# Patient Record
Sex: FEMALE | Race: WHITE
[De-identification: names, ages, dates, MRNs, and addresses within clinical notes are randomized per-mention and may not be internally consistent; named-entity substitution may affect disease eponyms.]

---

## 2023-01-11 ENCOUNTER — HOSPITAL ENCOUNTER (OUTPATIENT)
Dept: HOSPITAL 47 - RADMAMWWP | Age: 58
Discharge: HOME | End: 2023-01-11
Attending: OBSTETRICS & GYNECOLOGY
Payer: COMMERCIAL

## 2023-01-11 DIAGNOSIS — Z12.31: Primary | ICD-10-CM

## 2023-01-11 DIAGNOSIS — Z78.0: ICD-10-CM

## 2023-01-11 PROCEDURE — 77067 SCR MAMMO BI INCL CAD: CPT

## 2023-01-12 NOTE — MM
Reason for Exam: Screening  (asymptomatic). 

Last mammogram was performed 6 year(s) and 1 month(s) ago. 





Patient History: 

Menarche at age 11. First Full-Term Pregnancy at age 29. Late child-bearing (after 29).

Postmenopausal. Estrogen, ending at age 54. Progesterone, ending at age 54. 2008, Cyst Aspiration on

the Right side. 





Risk Values: 

Renate 5 year model risk: 1.8%.

NCI Lifetime model risk: 10.5%.





Prior Study Comparison: 

1/20/2012 Bilateral Diagnostic Mammogram, [facility]. 12/3/2016 Bilateral Screening Mammogram, [facility].

12/30/2016 Right Diagnostic Mammogram, [facility]. 





Tissue Density: 

The breast tissue is heterogeneously dense. This may lower the sensitivity of mammography.





Findings: 

Analyzed By CAD. 

There are regional central tiny round calcifications in the right breast redemonstrated. Stable 4 mm

round mass in the upper outer aspect right breast.



There is no suspicious new group of microcalcifications or new suspicious mass in either breast. 





Overall Assessment: Benign, BI-RAD 2





Management: 

Screening Mammogram of both breasts in 1 year.

Some advise bilateral breast ultrasound surveillance in patients with background dense tissue. A

clinical breast exam by your physician is recommended on an annual basis and results should be

correlated with mammographic findings.



Electronically signed and approved by: Adan Mayo M.D.